# Patient Record
Sex: FEMALE | Race: WHITE | NOT HISPANIC OR LATINO
[De-identification: names, ages, dates, MRNs, and addresses within clinical notes are randomized per-mention and may not be internally consistent; named-entity substitution may affect disease eponyms.]

---

## 2017-04-27 ENCOUNTER — APPOINTMENT (OUTPATIENT)
Dept: UROLOGY | Facility: CLINIC | Age: 22
End: 2017-04-27

## 2017-04-27 VITALS
WEIGHT: 190 LBS | DIASTOLIC BLOOD PRESSURE: 90 MMHG | HEIGHT: 64 IN | TEMPERATURE: 97.6 F | BODY MASS INDEX: 32.44 KG/M2 | SYSTOLIC BLOOD PRESSURE: 125 MMHG | HEART RATE: 73 BPM

## 2017-04-27 DIAGNOSIS — R35.1 NOCTURIA: ICD-10-CM

## 2017-04-27 DIAGNOSIS — Z87.39 PERSONAL HISTORY OF OTHER DISEASES OF THE MUSCULOSKELETAL SYSTEM AND CONNECTIVE TISSUE: ICD-10-CM

## 2017-04-27 DIAGNOSIS — Z78.9 OTHER SPECIFIED HEALTH STATUS: ICD-10-CM

## 2017-04-27 DIAGNOSIS — R30.9 PAINFUL MICTURITION, UNSPECIFIED: ICD-10-CM

## 2017-04-27 DIAGNOSIS — S40.019A CONTUSION OF UNSPECIFIED SHOULDER, INITIAL ENCOUNTER: ICD-10-CM

## 2017-04-27 DIAGNOSIS — R35.0 FREQUENCY OF MICTURITION: ICD-10-CM

## 2017-04-27 DIAGNOSIS — R32 UNSPECIFIED URINARY INCONTINENCE: ICD-10-CM

## 2017-04-27 LAB
BILIRUB UR QL STRIP: NORMAL
CLARITY UR: CLEAR
COLLECTION METHOD: NORMAL
GLUCOSE UR-MCNC: NORMAL
HCG UR QL: 0.2 EU/DL
HGB UR QL STRIP.AUTO: NORMAL
KETONES UR-MCNC: NORMAL
LEUKOCYTE ESTERASE UR QL STRIP: NORMAL
NITRITE UR QL STRIP: NORMAL
PH UR STRIP: 6
PROT UR STRIP-MCNC: NORMAL
SP GR UR STRIP: 1.01

## 2017-06-08 ENCOUNTER — APPOINTMENT (OUTPATIENT)
Dept: UROLOGY | Facility: CLINIC | Age: 22
End: 2017-06-08

## 2017-06-08 VITALS
BODY MASS INDEX: 32.44 KG/M2 | WEIGHT: 190 LBS | HEART RATE: 94 BPM | SYSTOLIC BLOOD PRESSURE: 119 MMHG | DIASTOLIC BLOOD PRESSURE: 81 MMHG | HEIGHT: 64 IN

## 2017-06-08 DIAGNOSIS — R32 UNSPECIFIED URINARY INCONTINENCE: ICD-10-CM

## 2017-06-08 DIAGNOSIS — R39.15 URGENCY OF URINATION: ICD-10-CM

## 2017-06-08 DIAGNOSIS — R35.0 FREQUENCY OF MICTURITION: ICD-10-CM

## 2017-06-08 RX ORDER — MIRABEGRON 50 MG/1
50 TABLET, FILM COATED, EXTENDED RELEASE ORAL
Qty: 30 | Refills: 5 | Status: ACTIVE | COMMUNITY
Start: 2017-04-27 | End: 1900-01-01

## 2017-08-10 ENCOUNTER — APPOINTMENT (OUTPATIENT)
Dept: UROLOGY | Facility: CLINIC | Age: 22
End: 2017-08-10

## 2022-03-05 DIAGNOSIS — Z13.6 ENCOUNTER FOR SCREENING FOR CARDIOVASCULAR DISORDERS: ICD-10-CM

## 2022-03-09 ENCOUNTER — APPOINTMENT (OUTPATIENT)
Dept: PEDIATRIC MEDICAL GENETICS | Facility: CLINIC | Age: 27
End: 2022-03-09

## 2022-03-09 ENCOUNTER — APPOINTMENT (OUTPATIENT)
Dept: PEDIATRIC CARDIOLOGY | Facility: CLINIC | Age: 27
End: 2022-03-09

## 2022-03-31 ENCOUNTER — APPOINTMENT (OUTPATIENT)
Dept: PEDIATRIC MEDICAL GENETICS | Facility: CLINIC | Age: 27
End: 2022-03-31

## 2022-03-31 NOTE — HISTORY OF PRESENT ILLNESS
[FreeTextEntry1] : Ms. Gerardo is a 26 year old female with multiple medical problems.  She is being referred by her Rheumatologist to rule out a connective tissue disorder due to chronic joint pain.  She gets no relief from OTC medications.    X-rays of the hands and wrists are normal bilaterally.  An MRI in Sept 2021 showed cervical and lumbar degenerative disc disease and osteoarthritis.  A CCP antibody, JOHN, rheumatoid factor, liver function tests and CBC were normal.  An ESR was slightly elevated at 26, CRP was 8 and she was slightly hyperglycemic at 102.  \par \par   Ms Gerardo claims to get injured easily and it takes her a long time for her to heal.  She had shoulder surgery (x3) following a rotator cuff injury and bursitis.  She had a meniscus tear of her left knee without trauma.  Surgery is planned for the future.  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  She is still healing and uses a cane to ambulate.  She had a back injury she sustained at work which is treated with repeated nerve blocks.  She has had surgery on her adenoids and sinuses.  She had an umbilical hernia repair at the age of 17.  She has had surgery for carpal tunnel syndrome.  \par \par Ms. Gerardo has gastroparesis, acid reflux, sleep apnea and arthritis.  She suffers from migraines which started during childhood.  During a workup for her migraines, she was diagnosed with a Chiari malformation.  However, a brain MRI performed in July 2014 was normal.  Ms. Gerardo has anxiety and depression.  Her early developmental milestones were normal.  During her school years, she was in special education due to a learning disability.  She as in a regular class but got pulled out for extra help with reading, writing and math.  She graduated HS with a regular diploma and attended 2 years of college.  She is currently not working due to a medical disability.\par \par Ms. Gerardo has a history of multiple urological symptoms.  Her urinary symptoms began at age 14 at which time she complained of frequent urination, nocturia, week stream, incomplete bladder emptying and incontinence.  She was diagnosed with pelvic floor weakness.  She had a normal cystoscopy and normal renal/pelvic sonogram in Oct 2014.  The sonogram revealed a nodule with central calcification on the left lower lobe of the lung and 2 adjacent small nodules.  She is being followed for these findings every 3 months.  \par \par Ms. Gerardo does not have a pectus deformity, scoliosis or flat feet.  She wears glasses for myopia.  She does not have dental crowding.  She has never had a spontaneous pneumothorax, subluxation, dislocation or organ prolapse.   She has chronic joint pain.  She does not bruise easily or have prolonged bleeding.   Ms. Gerardo reports that she has prolonged healing and abnormal scarring.

## 2022-03-31 NOTE — FAMILY HISTORY
[FreeTextEntry1] : Ms. Gerardo has a full sister and brother and a half-brother from a common mother who are healthy.  Her father had a knee replacement and has arthritis and sleep apnea.  The family history is otherwise unremarkable.  Her mother is of Tuvaluan/Mohawk/English/ ancestry and her father is of Sicilian/Mohawk/English/Polish ancestry.  Her parents are nonconsanguineous.

## 2022-03-31 NOTE — REASON FOR VISIT
[FreeTextEntry3] : she is being referred by her Rheumatologist to R/O a connective tissue disorder.\par \par Patient was seen with genetic counselor Stephanie Jensen.\par DRAFT WRITTEN PRIOR TO PATIENTS APPT

## 2022-10-14 ENCOUNTER — EMERGENCY (EMERGENCY)
Facility: HOSPITAL | Age: 27
LOS: 0 days | Discharge: HOME | End: 2022-10-14
Attending: EMERGENCY MEDICINE
Payer: MEDICAID

## 2022-10-14 VITALS
SYSTOLIC BLOOD PRESSURE: 124 MMHG | DIASTOLIC BLOOD PRESSURE: 73 MMHG | HEART RATE: 96 BPM | OXYGEN SATURATION: 100 % | RESPIRATION RATE: 19 BRPM | TEMPERATURE: 98 F

## 2022-10-14 VITALS
HEART RATE: 82 BPM | TEMPERATURE: 98 F | SYSTOLIC BLOOD PRESSURE: 115 MMHG | DIASTOLIC BLOOD PRESSURE: 81 MMHG | RESPIRATION RATE: 19 BRPM | OXYGEN SATURATION: 97 %

## 2022-10-14 DIAGNOSIS — K21.9 GASTRO-ESOPHAGEAL REFLUX DISEASE WITHOUT ESOPHAGITIS: ICD-10-CM

## 2022-10-14 DIAGNOSIS — O99.611 DISEASES OF THE DIGESTIVE SYSTEM COMPLICATING PREGNANCY, FIRST TRIMESTER: ICD-10-CM

## 2022-10-14 DIAGNOSIS — Z88.0 ALLERGY STATUS TO PENICILLIN: ICD-10-CM

## 2022-10-14 DIAGNOSIS — Z88.1 ALLERGY STATUS TO OTHER ANTIBIOTIC AGENTS STATUS: ICD-10-CM

## 2022-10-14 DIAGNOSIS — R11.2 NAUSEA WITH VOMITING, UNSPECIFIED: ICD-10-CM

## 2022-10-14 DIAGNOSIS — Z88.8 ALLERGY STATUS TO OTHER DRUGS, MEDICAMENTS AND BIOLOGICAL SUBSTANCES STATUS: ICD-10-CM

## 2022-10-14 DIAGNOSIS — Z3A.08 8 WEEKS GESTATION OF PREGNANCY: ICD-10-CM

## 2022-10-14 DIAGNOSIS — Z88.5 ALLERGY STATUS TO NARCOTIC AGENT: ICD-10-CM

## 2022-10-14 DIAGNOSIS — O21.9 VOMITING OF PREGNANCY, UNSPECIFIED: ICD-10-CM

## 2022-10-14 DIAGNOSIS — Z91.041 RADIOGRAPHIC DYE ALLERGY STATUS: ICD-10-CM

## 2022-10-14 LAB
ALBUMIN SERPL ELPH-MCNC: 3.4 G/DL — SIGNIFICANT CHANGE UP (ref 3.3–5)
ALP SERPL-CCNC: 78 U/L — SIGNIFICANT CHANGE UP (ref 40–120)
ALT FLD-CCNC: 24 U/L — SIGNIFICANT CHANGE UP (ref 12–78)
ANION GAP SERPL CALC-SCNC: 6 MMOL/L — SIGNIFICANT CHANGE UP (ref 5–17)
APPEARANCE UR: ABNORMAL
AST SERPL-CCNC: 12 U/L — LOW (ref 15–37)
BASOPHILS # BLD AUTO: 0.02 K/UL — SIGNIFICANT CHANGE UP (ref 0–0.2)
BASOPHILS NFR BLD AUTO: 0.2 % — SIGNIFICANT CHANGE UP (ref 0–2)
BILIRUB SERPL-MCNC: 0.2 MG/DL — SIGNIFICANT CHANGE UP (ref 0.2–1.2)
BILIRUB UR-MCNC: NEGATIVE — SIGNIFICANT CHANGE UP
BUN SERPL-MCNC: 10 MG/DL — SIGNIFICANT CHANGE UP (ref 7–23)
CALCIUM SERPL-MCNC: 9.2 MG/DL — SIGNIFICANT CHANGE UP (ref 8.5–10.1)
CHLORIDE SERPL-SCNC: 104 MMOL/L — SIGNIFICANT CHANGE UP (ref 96–108)
CO2 SERPL-SCNC: 28 MMOL/L — SIGNIFICANT CHANGE UP (ref 22–31)
COLOR SPEC: YELLOW — SIGNIFICANT CHANGE UP
CREAT SERPL-MCNC: 0.63 MG/DL — SIGNIFICANT CHANGE UP (ref 0.5–1.3)
DIFF PNL FLD: NEGATIVE — SIGNIFICANT CHANGE UP
EGFR: 125 ML/MIN/1.73M2 — SIGNIFICANT CHANGE UP
EOSINOPHIL # BLD AUTO: 0.02 K/UL — SIGNIFICANT CHANGE UP (ref 0–0.5)
EOSINOPHIL NFR BLD AUTO: 0.2 % — SIGNIFICANT CHANGE UP (ref 0–6)
GLUCOSE SERPL-MCNC: 90 MG/DL — SIGNIFICANT CHANGE UP (ref 70–99)
GLUCOSE UR QL: NEGATIVE — SIGNIFICANT CHANGE UP
HCT VFR BLD CALC: 34.3 % — LOW (ref 34.5–45)
HGB BLD-MCNC: 11.6 G/DL — SIGNIFICANT CHANGE UP (ref 11.5–15.5)
IMM GRANULOCYTES NFR BLD AUTO: 0.4 % — SIGNIFICANT CHANGE UP (ref 0–0.9)
KETONES UR-MCNC: NEGATIVE — SIGNIFICANT CHANGE UP
LEUKOCYTE ESTERASE UR-ACNC: ABNORMAL
LIDOCAIN IGE QN: 67 U/L — LOW (ref 73–393)
LYMPHOCYTES # BLD AUTO: 1.97 K/UL — SIGNIFICANT CHANGE UP (ref 1–3.3)
LYMPHOCYTES # BLD AUTO: 17.6 % — SIGNIFICANT CHANGE UP (ref 13–44)
MCHC RBC-ENTMCNC: 30.4 PG — SIGNIFICANT CHANGE UP (ref 27–34)
MCHC RBC-ENTMCNC: 33.8 GM/DL — SIGNIFICANT CHANGE UP (ref 32–36)
MCV RBC AUTO: 89.8 FL — SIGNIFICANT CHANGE UP (ref 80–100)
MONOCYTES # BLD AUTO: 0.51 K/UL — SIGNIFICANT CHANGE UP (ref 0–0.9)
MONOCYTES NFR BLD AUTO: 4.5 % — SIGNIFICANT CHANGE UP (ref 2–14)
NEUTROPHILS # BLD AUTO: 8.65 K/UL — HIGH (ref 1.8–7.4)
NEUTROPHILS NFR BLD AUTO: 77.1 % — HIGH (ref 43–77)
NITRITE UR-MCNC: NEGATIVE — SIGNIFICANT CHANGE UP
PH UR: 7 — SIGNIFICANT CHANGE UP (ref 5–8)
PLATELET # BLD AUTO: 295 K/UL — SIGNIFICANT CHANGE UP (ref 150–400)
POTASSIUM SERPL-MCNC: 3.7 MMOL/L — SIGNIFICANT CHANGE UP (ref 3.5–5.3)
POTASSIUM SERPL-SCNC: 3.7 MMOL/L — SIGNIFICANT CHANGE UP (ref 3.5–5.3)
PROT SERPL-MCNC: 7.8 GM/DL — SIGNIFICANT CHANGE UP (ref 6–8.3)
PROT UR-MCNC: NEGATIVE — SIGNIFICANT CHANGE UP
RBC # BLD: 3.82 M/UL — SIGNIFICANT CHANGE UP (ref 3.8–5.2)
RBC # FLD: 12.3 % — SIGNIFICANT CHANGE UP (ref 10.3–14.5)
SODIUM SERPL-SCNC: 138 MMOL/L — SIGNIFICANT CHANGE UP (ref 135–145)
SP GR SPEC: 1.01 — SIGNIFICANT CHANGE UP (ref 1.01–1.02)
UROBILINOGEN FLD QL: NEGATIVE — SIGNIFICANT CHANGE UP
WBC # BLD: 11.22 K/UL — HIGH (ref 3.8–10.5)
WBC # FLD AUTO: 11.22 K/UL — HIGH (ref 3.8–10.5)

## 2022-10-14 PROCEDURE — 99284 EMERGENCY DEPT VISIT MOD MDM: CPT

## 2022-10-14 PROCEDURE — 96374 THER/PROPH/DIAG INJ IV PUSH: CPT

## 2022-10-14 PROCEDURE — 99284 EMERGENCY DEPT VISIT MOD MDM: CPT | Mod: 25

## 2022-10-14 PROCEDURE — 83690 ASSAY OF LIPASE: CPT

## 2022-10-14 PROCEDURE — 87086 URINE CULTURE/COLONY COUNT: CPT

## 2022-10-14 PROCEDURE — 85025 COMPLETE CBC W/AUTO DIFF WBC: CPT

## 2022-10-14 PROCEDURE — 80053 COMPREHEN METABOLIC PANEL: CPT

## 2022-10-14 PROCEDURE — 81001 URINALYSIS AUTO W/SCOPE: CPT

## 2022-10-14 PROCEDURE — 36415 COLL VENOUS BLD VENIPUNCTURE: CPT

## 2022-10-14 RX ORDER — SODIUM CHLORIDE 9 MG/ML
1000 INJECTION, SOLUTION INTRAVENOUS
Refills: 0 | Status: DISCONTINUED | OUTPATIENT
Start: 2022-10-14 | End: 2022-10-14

## 2022-10-14 RX ORDER — SODIUM CHLORIDE 9 MG/ML
1000 INJECTION INTRAMUSCULAR; INTRAVENOUS; SUBCUTANEOUS ONCE
Refills: 0 | Status: COMPLETED | OUTPATIENT
Start: 2022-10-14 | End: 2022-10-14

## 2022-10-14 RX ORDER — METOCLOPRAMIDE HCL 10 MG
10 TABLET ORAL ONCE
Refills: 0 | Status: COMPLETED | OUTPATIENT
Start: 2022-10-14 | End: 2022-10-14

## 2022-10-14 RX ADMIN — Medication 10 MILLIGRAM(S): at 17:57

## 2022-10-14 RX ADMIN — SODIUM CHLORIDE 250 MILLILITER(S): 9 INJECTION, SOLUTION INTRAVENOUS at 17:56

## 2022-10-14 NOTE — ED STATDOCS - NS ED ROS FT
Constitutional: nad, well appearing  HEENT:  no nasal congestion, eye drainage or ear pain.    CVS:  no cp  Resp:  No sob, no cough  GI:  no abdominal pain, +n/v  :  no dysuria  MSK: no joint pain or limited ROM  Skin: no rash  Neuro: no change in mental status or level of consciousness  Heme/lymph: no bleeding

## 2022-10-14 NOTE — ED STATDOCS - OBJECTIVE STATEMENT
26 y/o female 8 weeks pregnant with a PMHx of GERD presents to the ED c/o n/v. Pt states she has been nauseous throughout her pregnancy. Pt states she was on Pantoprazole for GERD, was told it was not safe in pregnancy and switched to Omeprazole. Denies vaginal bleeding, discharge, burning with urination. No other complaints at this time. OBGYN: Dr. Ramírez.

## 2022-10-14 NOTE — ED STATDOCS - CARE PLAN
Principal Discharge DX:	Nausea/vomiting in pregnancy  Secondary Diagnosis:	GERD (gastroesophageal reflux disease)   1

## 2022-10-14 NOTE — ED ADULT NURSE NOTE - OBJECTIVE STATEMENT
Pt comes to the ED for evaluation of acid reflux. Pt states that she is 8 weeks pregnant and that she suffers from acid reflux prior to becoming pregnant and that she has not been able to take her prescribed medication with being pregnant and other medications are not working. Pt c/o nausea without vomiting.

## 2022-10-14 NOTE — ED STATDOCS - PROGRESS NOTE DETAILS
28 y/o Female, 8 weeks pregnant, presents to ED c/o nausea and vomiting over last 2 weeks.  Pt with h/o GERD that usually responds to Pantoprazole, but OB said she cannot take it in pregnancy.  Started PO Omeprazole today at 1pm.  Pt reports she is approx 8-9 weeks pregnant.  Food seen in vomit.  Neg blood.  Denies dysuria, hematuria.  Denies fevers.  Urine cloudy with occasional bacteria.  As pt does not have symptoms of UTI and has multiple allergies to meds, will wait on Urine Cx results prior to starting treatment.  Discussed plan with pt.  Pt will continue dyclegis, omeprazole at home.  f/u with PMD.  Yodit Caputo PA-C Pt ate some crackers and had sips of liquid without vomiting.  Will dc home to f/u with PMD.  Yodit Caputo PA-C

## 2022-10-14 NOTE — ED ADULT TRIAGE NOTE - CHIEF COMPLAINT QUOTE
pt presents to ed ambulatory for evaluation of nausea and vomiting- pt is 8-9 weeks pregnant and has been  nauseated through out the pregnancy. reports she has gerd too and on Omeprazole with no relief. this is 1st pregnancy, OB dr allen. denies vaginal bleeding/leaking/cramping

## 2022-10-14 NOTE — ED STATDOCS - PATIENT PORTAL LINK FT
You can access the FollowMyHealth Patient Portal offered by [facility] by registering at the following website: [email or web address]. By joining [employer]’s FollowMyHealth portal, you will also be able to view your health information using other applications (apps) compatible with our system.

## 2022-10-14 NOTE — ED STATDOCS - NSFOLLOWUPINSTRUCTIONS_ED_ALL_ED_FT
Morning Sickness       Morning sickness is when you feel like you may vomit (feel nauseous) during pregnancy. Sometimes, you may vomit. Morning sickness most often happens in the morning, but it can also happen at any time of the day. Some women may have morning sickness that makes them vomit all the time. This is a more serious problem that needs treatment.      What are the causes?    The cause of this condition is not known.      What increases the risk?    •You had vomiting or a feeling like you may vomit before your pregnancy.      •You had morning sickness in another pregnancy.       •You are pregnant with more than one baby, such as twins.        What are the signs or symptoms?    •Feeling like you may vomit.      •Vomiting.        How is this treated?    Treatment is usually not needed for this condition. You may only need to change what you eat. In some cases, your doctor may give you some things to take for your condition. These include:  •Vitamin B6 supplements.      •Medicines to treat the feeling that you may vomit.      •Ginger.        Follow these instructions at home:    Medicines     •Take over-the-counter and prescription medicines only as told by your doctor. Do not take any medicines until you talk with your doctor about them first.      •Take multivitamins before you get pregnant. These can stop or lessen the symptoms of morning sickness.      Eating and drinking     •Eat dry toast or crackers before getting out of bed.      •Eat 5 or 6 small meals a day.      •Eat dry and bland foods like rice and baked potatoes.      • Do not eat greasy, fatty, or spicy foods.      •Have someone cook for you if the smell of food causes you to vomit or to feel like you may vomit.      •If you feel like you may vomit after taking prenatal vitamins, take them at night or with a snack.      •Eat protein foods when you need a snack. Nuts, yogurt, and cheese are good choices.      •Drink fluids throughout the day.      •Try ginger ale made with real ginger, ginger tea made from fresh grated ginger, or ginger candies.      General instructions     • Do not smoke or use any products that contain nicotine or tobacco. If you need help quitting, ask your doctor.      •Use an air purifier to keep the air in your house free of smells.      •Get lots of fresh air.      •Try to avoid smells that make you feel sick.      •Try wearing an acupressure wristband. This is a wristband that is used to treat seasickness.      •Try a treatment called acupuncture. In this treatment, a doctor puts needles into certain areas of your body to make you feel better.        Contact a doctor if:    •You need medicine to feel better.      •You feel dizzy or light-headed.      •You are losing weight.        Get help right away if:    •The feeling that you may vomit will not go away, or you cannot stop vomiting.      •You faint.      •You have very bad pain in your belly.        Summary    •Morning sickness is when you feel like you may vomit (feel nauseous) during pregnancy.      •You may feel sick in the morning, but you can feel this way at any time of the day.      •Making some changes to what you eat may help your symptoms go away.      This information is not intended to replace advice given to you by your health care provider. Make sure you discuss any questions you have with your health care provider.      Document Revised: 08/02/2021 Document Reviewed: 07/12/2021    Elsevier Patient Education © 2022 Elsevier Inc.

## 2022-10-14 NOTE — ED STATDOCS - NS ED ATTENDING STATEMENT MOD
This was a shared visit with the ABDIAS. I reviewed and verified the documentation and independently performed the documented:

## 2022-10-16 LAB
CULTURE RESULTS: NO GROWTH — SIGNIFICANT CHANGE UP
SPECIMEN SOURCE: SIGNIFICANT CHANGE UP

## 2022-12-03 ENCOUNTER — EMERGENCY (EMERGENCY)
Facility: HOSPITAL | Age: 27
LOS: 0 days | Discharge: HOME | End: 2022-12-03
Attending: EMERGENCY MEDICINE
Payer: MEDICAID

## 2022-12-03 VITALS
HEART RATE: 88 BPM | TEMPERATURE: 99 F | OXYGEN SATURATION: 99 % | DIASTOLIC BLOOD PRESSURE: 76 MMHG | SYSTOLIC BLOOD PRESSURE: 119 MMHG | RESPIRATION RATE: 18 BRPM

## 2022-12-03 VITALS
TEMPERATURE: 100 F | RESPIRATION RATE: 16 BRPM | HEART RATE: 94 BPM | OXYGEN SATURATION: 100 % | SYSTOLIC BLOOD PRESSURE: 119 MMHG | DIASTOLIC BLOOD PRESSURE: 76 MMHG

## 2022-12-03 DIAGNOSIS — Z88.8 ALLERGY STATUS TO OTHER DRUGS, MEDICAMENTS AND BIOLOGICAL SUBSTANCES STATUS: ICD-10-CM

## 2022-12-03 DIAGNOSIS — Z88.6 ALLERGY STATUS TO ANALGESIC AGENT: ICD-10-CM

## 2022-12-03 DIAGNOSIS — Z3A.15 15 WEEKS GESTATION OF PREGNANCY: ICD-10-CM

## 2022-12-03 DIAGNOSIS — O26.891 OTHER SPECIFIED PREGNANCY RELATED CONDITIONS, FIRST TRIMESTER: ICD-10-CM

## 2022-12-03 DIAGNOSIS — K31.84 GASTROPARESIS: ICD-10-CM

## 2022-12-03 DIAGNOSIS — O23.42 UNSPECIFIED INFECTION OF URINARY TRACT IN PREGNANCY, SECOND TRIMESTER: ICD-10-CM

## 2022-12-03 DIAGNOSIS — Z88.5 ALLERGY STATUS TO NARCOTIC AGENT: ICD-10-CM

## 2022-12-03 DIAGNOSIS — M54.9 DORSALGIA, UNSPECIFIED: ICD-10-CM

## 2022-12-03 DIAGNOSIS — Z98.890 OTHER SPECIFIED POSTPROCEDURAL STATES: ICD-10-CM

## 2022-12-03 DIAGNOSIS — O99.891 OTHER SPECIFIED DISEASES AND CONDITIONS COMPLICATING PREGNANCY: ICD-10-CM

## 2022-12-03 DIAGNOSIS — G89.29 OTHER CHRONIC PAIN: ICD-10-CM

## 2022-12-03 DIAGNOSIS — K21.9 GASTRO-ESOPHAGEAL REFLUX DISEASE WITHOUT ESOPHAGITIS: ICD-10-CM

## 2022-12-03 DIAGNOSIS — R10.31 RIGHT LOWER QUADRANT PAIN: ICD-10-CM

## 2022-12-03 DIAGNOSIS — Z88.0 ALLERGY STATUS TO PENICILLIN: ICD-10-CM

## 2022-12-03 DIAGNOSIS — Z88.1 ALLERGY STATUS TO OTHER ANTIBIOTIC AGENTS STATUS: ICD-10-CM

## 2022-12-03 DIAGNOSIS — O99.612 DISEASES OF THE DIGESTIVE SYSTEM COMPLICATING PREGNANCY, SECOND TRIMESTER: ICD-10-CM

## 2022-12-03 DIAGNOSIS — Z20.822 CONTACT WITH AND (SUSPECTED) EXPOSURE TO COVID-19: ICD-10-CM

## 2022-12-03 DIAGNOSIS — Z91.041 RADIOGRAPHIC DYE ALLERGY STATUS: ICD-10-CM

## 2022-12-03 LAB
ALBUMIN SERPL ELPH-MCNC: 3 G/DL — LOW (ref 3.3–5)
ALP SERPL-CCNC: 79 U/L — SIGNIFICANT CHANGE UP (ref 40–120)
ALT FLD-CCNC: 20 U/L — SIGNIFICANT CHANGE UP (ref 12–78)
ANION GAP SERPL CALC-SCNC: 7 MMOL/L — SIGNIFICANT CHANGE UP (ref 5–17)
APPEARANCE UR: ABNORMAL
AST SERPL-CCNC: 27 U/L — SIGNIFICANT CHANGE UP (ref 15–37)
BASOPHILS # BLD AUTO: 0.02 K/UL — SIGNIFICANT CHANGE UP (ref 0–0.2)
BASOPHILS NFR BLD AUTO: 0.2 % — SIGNIFICANT CHANGE UP (ref 0–2)
BILIRUB SERPL-MCNC: 0.3 MG/DL — SIGNIFICANT CHANGE UP (ref 0.2–1.2)
BILIRUB UR-MCNC: NEGATIVE — SIGNIFICANT CHANGE UP
BUN SERPL-MCNC: 8 MG/DL — SIGNIFICANT CHANGE UP (ref 7–23)
CALCIUM SERPL-MCNC: 9.1 MG/DL — SIGNIFICANT CHANGE UP (ref 8.5–10.1)
CHLORIDE SERPL-SCNC: 104 MMOL/L — SIGNIFICANT CHANGE UP (ref 96–108)
CO2 SERPL-SCNC: 25 MMOL/L — SIGNIFICANT CHANGE UP (ref 22–31)
COLOR SPEC: YELLOW — SIGNIFICANT CHANGE UP
CREAT SERPL-MCNC: 0.57 MG/DL — SIGNIFICANT CHANGE UP (ref 0.5–1.3)
DIFF PNL FLD: NEGATIVE — SIGNIFICANT CHANGE UP
EGFR: 128 ML/MIN/1.73M2 — SIGNIFICANT CHANGE UP
EOSINOPHIL # BLD AUTO: 0.03 K/UL — SIGNIFICANT CHANGE UP (ref 0–0.5)
EOSINOPHIL NFR BLD AUTO: 0.3 % — SIGNIFICANT CHANGE UP (ref 0–6)
FLUAV AG NPH QL: SIGNIFICANT CHANGE UP
FLUBV AG NPH QL: SIGNIFICANT CHANGE UP
GLUCOSE SERPL-MCNC: 88 MG/DL — SIGNIFICANT CHANGE UP (ref 70–99)
GLUCOSE UR QL: NEGATIVE — SIGNIFICANT CHANGE UP
HCG SERPL-ACNC: HIGH MIU/ML
HCT VFR BLD CALC: 33.8 % — LOW (ref 34.5–45)
HGB BLD-MCNC: 11.2 G/DL — LOW (ref 11.5–15.5)
IMM GRANULOCYTES NFR BLD AUTO: 0.3 % — SIGNIFICANT CHANGE UP (ref 0–0.9)
KETONES UR-MCNC: ABNORMAL
LEUKOCYTE ESTERASE UR-ACNC: ABNORMAL
LYMPHOCYTES # BLD AUTO: 18.3 % — SIGNIFICANT CHANGE UP (ref 13–44)
LYMPHOCYTES # BLD AUTO: 2.01 K/UL — SIGNIFICANT CHANGE UP (ref 1–3.3)
MCHC RBC-ENTMCNC: 29.3 PG — SIGNIFICANT CHANGE UP (ref 27–34)
MCHC RBC-ENTMCNC: 33.1 GM/DL — SIGNIFICANT CHANGE UP (ref 32–36)
MCV RBC AUTO: 88.5 FL — SIGNIFICANT CHANGE UP (ref 80–100)
MONOCYTES # BLD AUTO: 0.53 K/UL — SIGNIFICANT CHANGE UP (ref 0–0.9)
MONOCYTES NFR BLD AUTO: 4.8 % — SIGNIFICANT CHANGE UP (ref 2–14)
NEUTROPHILS # BLD AUTO: 8.35 K/UL — HIGH (ref 1.8–7.4)
NEUTROPHILS NFR BLD AUTO: 76.1 % — SIGNIFICANT CHANGE UP (ref 43–77)
NITRITE UR-MCNC: NEGATIVE — SIGNIFICANT CHANGE UP
PH UR: 6 — SIGNIFICANT CHANGE UP (ref 5–8)
PLATELET # BLD AUTO: 296 K/UL — SIGNIFICANT CHANGE UP (ref 150–400)
POTASSIUM SERPL-MCNC: 4 MMOL/L — SIGNIFICANT CHANGE UP (ref 3.5–5.3)
POTASSIUM SERPL-SCNC: 4 MMOL/L — SIGNIFICANT CHANGE UP (ref 3.5–5.3)
PROT SERPL-MCNC: 7.5 GM/DL — SIGNIFICANT CHANGE UP (ref 6–8.3)
PROT UR-MCNC: 30 MG/DL
RBC # BLD: 3.82 M/UL — SIGNIFICANT CHANGE UP (ref 3.8–5.2)
RBC # FLD: 12.6 % — SIGNIFICANT CHANGE UP (ref 10.3–14.5)
RSV RNA NPH QL NAA+NON-PROBE: SIGNIFICANT CHANGE UP
SARS-COV-2 RNA SPEC QL NAA+PROBE: SIGNIFICANT CHANGE UP
SODIUM SERPL-SCNC: 136 MMOL/L — SIGNIFICANT CHANGE UP (ref 135–145)
SP GR SPEC: 1.02 — SIGNIFICANT CHANGE UP (ref 1.01–1.02)
UROBILINOGEN FLD QL: 1
WBC # BLD: 10.97 K/UL — HIGH (ref 3.8–10.5)
WBC # FLD AUTO: 10.97 K/UL — HIGH (ref 3.8–10.5)

## 2022-12-03 PROCEDURE — 76805 OB US >/= 14 WKS SNGL FETUS: CPT

## 2022-12-03 PROCEDURE — 36415 COLL VENOUS BLD VENIPUNCTURE: CPT

## 2022-12-03 PROCEDURE — 99285 EMERGENCY DEPT VISIT HI MDM: CPT

## 2022-12-03 PROCEDURE — 87086 URINE CULTURE/COLONY COUNT: CPT

## 2022-12-03 PROCEDURE — 99284 EMERGENCY DEPT VISIT MOD MDM: CPT | Mod: 25

## 2022-12-03 PROCEDURE — 96374 THER/PROPH/DIAG INJ IV PUSH: CPT

## 2022-12-03 PROCEDURE — 80053 COMPREHEN METABOLIC PANEL: CPT

## 2022-12-03 PROCEDURE — 76805 OB US >/= 14 WKS SNGL FETUS: CPT | Mod: 26

## 2022-12-03 PROCEDURE — 81001 URINALYSIS AUTO W/SCOPE: CPT

## 2022-12-03 PROCEDURE — 85025 COMPLETE CBC W/AUTO DIFF WBC: CPT

## 2022-12-03 PROCEDURE — 84702 CHORIONIC GONADOTROPIN TEST: CPT

## 2022-12-03 PROCEDURE — 0241U: CPT

## 2022-12-03 RX ORDER — ACETAMINOPHEN 500 MG
1000 TABLET ORAL ONCE
Refills: 0 | Status: COMPLETED | OUTPATIENT
Start: 2022-12-03 | End: 2022-12-03

## 2022-12-03 RX ORDER — NITROFURANTOIN MACROCRYSTAL 50 MG
100 CAPSULE ORAL ONCE
Refills: 0 | Status: COMPLETED | OUTPATIENT
Start: 2022-12-03 | End: 2022-12-03

## 2022-12-03 RX ORDER — SODIUM CHLORIDE 9 MG/ML
1000 INJECTION INTRAMUSCULAR; INTRAVENOUS; SUBCUTANEOUS ONCE
Refills: 0 | Status: COMPLETED | OUTPATIENT
Start: 2022-12-03 | End: 2022-12-03

## 2022-12-03 RX ORDER — NITROFURANTOIN MACROCRYSTAL 50 MG
1 CAPSULE ORAL
Qty: 14 | Refills: 0
Start: 2022-12-03 | End: 2022-12-09

## 2022-12-03 RX ADMIN — Medication 400 MILLIGRAM(S): at 14:33

## 2022-12-03 RX ADMIN — Medication 100 MILLIGRAM(S): at 15:07

## 2022-12-03 RX ADMIN — SODIUM CHLORIDE 2000 MILLILITER(S): 9 INJECTION INTRAMUSCULAR; INTRAVENOUS; SUBCUTANEOUS at 14:34

## 2022-12-03 NOTE — ED STATDOCS - OBJECTIVE STATEMENT
26 yo female w/PMHx of gastroparesis, hernia repair, GERD presents to the ED c/o right sided lower abd pain that started this morning. Pt reports that the pain is intermittent. Pt was wrapping presents this morning when the pain began. Pt is 15 weeks pregnant. Denies any vaginal bleeding or cramping. Pt reports having back pain but states that it is chronic. Pt did not take anything for the pain. No other complaints at this time.

## 2022-12-03 NOTE — ED STATDOCS - PROGRESS NOTE DETAILS
pt aware of labs and imaging results and treated for uti, pt aware to fu with orthopedic team and agrees with plan. pt well appearing on dc. -Sol Kyle PA-C

## 2022-12-03 NOTE — ED ADULT TRIAGE NOTE - CHIEF COMPLAINT QUOTE
pt presents to ED due to complaints of abdominal pain and back pain pt is currently 15 weeks pregnant states she has no vaginal bleeding

## 2022-12-03 NOTE — ED ADULT NURSE NOTE - NSIMPLEMENTINTERV_GEN_ALL_ED
Implemented All Universal Safety Interventions:  [place] to call system. Call bell, personal items and telephone within reach. Instruct patient to call for assistance. Room bathroom lighting operational. Non-slip footwear when patient is off stretcher. Physically safe environment: no spills, clutter or unnecessary equipment. Stretcher in lowest position, wheels locked, appropriate side rails in place.

## 2022-12-03 NOTE — ED ADULT NURSE NOTE - CAS EDN DISCHARGE ASSESSMENT
Dr. Farrar: I performed the initial face to face bedside interview with this patient regarding history of present illness, review of symptoms and past medical, social and family history.  I completed an independent physical examination.  I was the initial provider who evaluated this patient.  The history, review of symptoms and examination was documented by the scribe in my presence and I attest to the accuracy of the documentation.  I have signed out the follow up of any pending tests (i.e. labs, radiological studies) to the PA.  I have discussed the patient’s plan of care and disposition with the PA.
Alert and oriented to person, place and time

## 2022-12-03 NOTE — ED STATDOCS - ATTENDING APP SHARED VISIT CONTRIBUTION OF CARE
I, Carmela Quevedo MD, personally saw the patient with ACP.  I have personally performed a face to face diagnostic evaluation on this patient.  I have reviewed the ACP note and agree with the history, exam, and plan of care, except as noted.  I personally saw the patient and performed a substantive portion of the visit including all aspects of the medical decision making.

## 2022-12-03 NOTE — ED STATDOCS - PATIENT PORTAL LINK FT
You can access the FollowMyHealth Patient Portal offered by [facility] by registering at the following website: [email or web address]. By joining [employer] [facility]’s FollowMyHealth portal, you will also be able to view your health information using other applications (apps) compatible with our system.

## 2022-12-04 LAB
CULTURE RESULTS: SIGNIFICANT CHANGE UP
SPECIMEN SOURCE: SIGNIFICANT CHANGE UP

## 2023-02-28 ENCOUNTER — OUTPATIENT (OUTPATIENT)
Dept: INPATIENT UNIT | Facility: HOSPITAL | Age: 28
LOS: 1 days | End: 2023-02-28
Payer: MEDICAID

## 2023-02-28 VITALS
TEMPERATURE: 98 F | SYSTOLIC BLOOD PRESSURE: 126 MMHG | RESPIRATION RATE: 22 BRPM | DIASTOLIC BLOOD PRESSURE: 77 MMHG | HEART RATE: 109 BPM

## 2023-02-28 VITALS — SYSTOLIC BLOOD PRESSURE: 111 MMHG | HEART RATE: 92 BPM | DIASTOLIC BLOOD PRESSURE: 70 MMHG

## 2023-02-28 DIAGNOSIS — O47.03 FALSE LABOR BEFORE 37 COMPLETED WEEKS OF GESTATION, THIRD TRIMESTER: ICD-10-CM

## 2023-02-28 LAB
ALBUMIN SERPL ELPH-MCNC: 3.5 G/DL — SIGNIFICANT CHANGE UP (ref 3.3–5.2)
ALP SERPL-CCNC: 131 U/L — HIGH (ref 40–120)
ALT FLD-CCNC: 18 U/L — SIGNIFICANT CHANGE UP
ANION GAP SERPL CALC-SCNC: 14 MMOL/L — SIGNIFICANT CHANGE UP (ref 5–17)
APPEARANCE UR: ABNORMAL
AST SERPL-CCNC: 19 U/L — SIGNIFICANT CHANGE UP
BACTERIA # UR AUTO: ABNORMAL
BILIRUB SERPL-MCNC: <0.2 MG/DL — LOW (ref 0.4–2)
BILIRUB UR-MCNC: NEGATIVE — SIGNIFICANT CHANGE UP
BUN SERPL-MCNC: 6 MG/DL — LOW (ref 8–20)
CALCIUM SERPL-MCNC: 9.3 MG/DL — SIGNIFICANT CHANGE UP (ref 8.4–10.5)
CHLORIDE SERPL-SCNC: 101 MMOL/L — SIGNIFICANT CHANGE UP (ref 96–108)
CO2 SERPL-SCNC: 23 MMOL/L — SIGNIFICANT CHANGE UP (ref 22–29)
COLOR SPEC: YELLOW — SIGNIFICANT CHANGE UP
CREAT SERPL-MCNC: 0.52 MG/DL — SIGNIFICANT CHANGE UP (ref 0.5–1.3)
DIFF PNL FLD: NEGATIVE — SIGNIFICANT CHANGE UP
EGFR: 131 ML/MIN/1.73M2 — SIGNIFICANT CHANGE UP
EPI CELLS # UR: SIGNIFICANT CHANGE UP
GLUCOSE SERPL-MCNC: 105 MG/DL — HIGH (ref 70–99)
GLUCOSE UR QL: NEGATIVE MG/DL — SIGNIFICANT CHANGE UP
HCT VFR BLD CALC: 32.6 % — LOW (ref 34.5–45)
HGB BLD-MCNC: 10.6 G/DL — LOW (ref 11.5–15.5)
KETONES UR-MCNC: NEGATIVE — SIGNIFICANT CHANGE UP
LEUKOCYTE ESTERASE UR-ACNC: ABNORMAL
MCHC RBC-ENTMCNC: 28.5 PG — SIGNIFICANT CHANGE UP (ref 27–34)
MCHC RBC-ENTMCNC: 32.5 GM/DL — SIGNIFICANT CHANGE UP (ref 32–36)
MCV RBC AUTO: 87.6 FL — SIGNIFICANT CHANGE UP (ref 80–100)
NITRITE UR-MCNC: NEGATIVE — SIGNIFICANT CHANGE UP
PH UR: 7 — SIGNIFICANT CHANGE UP (ref 5–8)
PLATELET # BLD AUTO: 329 K/UL — SIGNIFICANT CHANGE UP (ref 150–400)
POTASSIUM SERPL-MCNC: 4 MMOL/L — SIGNIFICANT CHANGE UP (ref 3.5–5.3)
POTASSIUM SERPL-SCNC: 4 MMOL/L — SIGNIFICANT CHANGE UP (ref 3.5–5.3)
PROT SERPL-MCNC: 6.9 G/DL — SIGNIFICANT CHANGE UP (ref 6.6–8.7)
PROT UR-MCNC: 15
RBC # BLD: 3.72 M/UL — LOW (ref 3.8–5.2)
RBC # FLD: 14 % — SIGNIFICANT CHANGE UP (ref 10.3–14.5)
RBC CASTS # UR COMP ASSIST: SIGNIFICANT CHANGE UP /HPF (ref 0–4)
SODIUM SERPL-SCNC: 138 MMOL/L — SIGNIFICANT CHANGE UP (ref 135–145)
SP GR SPEC: 1.01 — SIGNIFICANT CHANGE UP (ref 1.01–1.02)
UROBILINOGEN FLD QL: 1 MG/DL
WBC # BLD: 12.12 K/UL — HIGH (ref 3.8–10.5)
WBC # FLD AUTO: 12.12 K/UL — HIGH (ref 3.8–10.5)
WBC UR QL: SIGNIFICANT CHANGE UP /HPF (ref 0–5)

## 2023-02-28 PROCEDURE — 59025 FETAL NON-STRESS TEST: CPT

## 2023-02-28 PROCEDURE — 81001 URINALYSIS AUTO W/SCOPE: CPT

## 2023-02-28 PROCEDURE — G0463: CPT

## 2023-02-28 PROCEDURE — 80053 COMPREHEN METABOLIC PANEL: CPT

## 2023-02-28 PROCEDURE — 85027 COMPLETE CBC AUTOMATED: CPT

## 2023-02-28 PROCEDURE — 36415 COLL VENOUS BLD VENIPUNCTURE: CPT

## 2023-02-28 RX ORDER — ONDANSETRON 8 MG/1
4 TABLET, FILM COATED ORAL ONCE
Refills: 0 | Status: COMPLETED | OUTPATIENT
Start: 2023-02-28 | End: 2023-02-28

## 2023-02-28 RX ORDER — ONDANSETRON 8 MG/1
1 TABLET, FILM COATED ORAL
Qty: 12 | Refills: 0
Start: 2023-02-28 | End: 2023-03-02

## 2023-02-28 RX ORDER — PANTOPRAZOLE SODIUM 20 MG/1
1 TABLET, DELAYED RELEASE ORAL
Qty: 30 | Refills: 0
Start: 2023-02-28 | End: 2023-03-29

## 2023-02-28 RX ORDER — SODIUM CHLORIDE 9 MG/ML
1000 INJECTION, SOLUTION INTRAVENOUS ONCE
Refills: 0 | Status: COMPLETED | OUTPATIENT
Start: 2023-02-28 | End: 2023-02-28

## 2023-02-28 RX ORDER — FAMOTIDINE 10 MG/ML
20 INJECTION INTRAVENOUS ONCE
Refills: 0 | Status: COMPLETED | OUTPATIENT
Start: 2023-02-28 | End: 2023-02-28

## 2023-02-28 RX ADMIN — ONDANSETRON 4 MILLIGRAM(S): 8 TABLET, FILM COATED ORAL at 13:13

## 2023-02-28 RX ADMIN — SODIUM CHLORIDE 1000 MILLILITER(S): 9 INJECTION, SOLUTION INTRAVENOUS at 12:40

## 2023-02-28 RX ADMIN — FAMOTIDINE 20 MILLIGRAM(S): 10 INJECTION INTRAVENOUS at 13:22

## 2023-02-28 NOTE — OB RN TRIAGE NOTE - NSOBDISCHARGEVS_OBGYN_ALL_OB
Confirmed that patient received an additional set of vital signs prior to discharge.
Referral for High Risk Care

## 2023-02-28 NOTE — OB RN TRIAGE NOTE - FALL HARM RISK - HARM RISK INTERVENTIONS

## 2023-02-28 NOTE — OB PROVIDER TRIAGE NOTE - HISTORY OF PRESENT ILLNESS
BRIDGET ERAZO is a 26yo  at 28 weeks (dated by LMP c/w x 1st trimester sono) with PMHx of GERD, gastroparesis, anxiety, iron deficiency anemia presenting for nausea and vomiting for 4 days. Patient reports feeling about 1 episode of vomiting every day for the past 4 days at night right before going to sleep. She has also recently had more problems with her acid reflux, has been taking her PRN pepcid every day as well as her normally scheduled omeprazole. Denies fever, chills, diarrhea, abdominal pain.     ROS:  Denies leakage of fluids, vaginal bleeding, painful contractions. Reports active fetal movement.   Denies RUQ pain, vision changes, increased leg swelling.   +Migraine headaches. No dizziness, lightheadedness.  +Knee, ankle, back pain at baseline.     PNC @    OBhx: denies  Gyn: denies hx of STIs, fibroids, or abnormal paps.  PMH: + GERD, gastroparesis, anxiety/depression, chronic pain  PSH: + umbilical hernia repair   Med: PNV, pepcid, omeprazole, trazodone, fluoxetine, B12, D3  Allergies: penicillin, oxycodone, codeine, gabapentin, prednisone (urticaria for all)    Vitals:   T(C): 36.8 (23 @ 12:19), Max: 36.8 (23 @ 12:19)  T(F): 98.2 (23 @ 12:19), Max: 98.2 (23 @ 12:19)  HR: 102 (23 @ 12:55) (102 - 112)  BP: 126/77 (23 @ 12:30) (126/77 - 126/77)  RR: 22 (23 @ 12:19) (22 - 22)  SpO2: 95% (23 @ 12:55) (95% - 97%)    General: AAOx3, NAD  Abd: Soft, nontender, gravid      FHT: Cat I     BRIDGET ERAZO is a 26yo  at 28 weeks (dated by LMP c/w x 1st trimester sono) with PMHx of GERD, gastroparesis, anxiety, iron deficiency anemia presenting for nausea and vomiting for 4 days. Patient reports feeling about 1 episode of vomiting every day for the past 4 days at night right before going to sleep. She has also recently had more problems with her acid reflux, has been taking her PRN pepcid every day as well as her normally scheduled omeprazole. Before the pregnancy, she was on 40mg pantoprazole but now is only on 10mg omeprazole and feels it is not enough to control her acid reflux. She has also been taking TUMS with no improvement. Is compliant with lifestyle modifications including not eating about 5 hours before bed and not eating spicy or acidic foods. Denies fever, chills, diarrhea, abdominal pain.     ROS:  Denies leakage of fluids, vaginal bleeding, painful contractions. Reports active fetal movement.   Denies RUQ pain, vision changes, increased leg swelling.   +Migraine headaches. No dizziness, lightheadedness.  +Knee, ankle, back pain at baseline.     PNC @    OBhx: denies  Gyn: denies hx of STIs, fibroids, or abnormal paps.  PMH: + GERD, gastroparesis, anxiety/depression, chronic pain  PSH: + umbilical hernia repair   Med: PNV, pepcid, omeprazole, trazodone, fluoxetine, B12, D3  Allergies: penicillin, oxycodone, codeine, gabapentin, prednisone (urticaria for all)    Vitals:   T(C): 36.8 (23 @ 12:19), Max: 36.8 (23 @ 12:19)  T(F): 98.2 (23 @ 12:19), Max: 98.2 (23 @ 12:19)  HR: 102 (23 @ 12:55) (102 - 112)  BP: 126/77 (23 @ 12:30) (126/77 - 126/77)  RR: 22 (23 @ 12:19) (22 - 22)  SpO2: 95% (23 @ 12:55) (95% - 97%)    General: AAOx3, NAD  Abd: Soft, nontender, gravid      FHT: Cat I     BRIDGET ERAZO is a 28yo  at 28w2d (dated by LMP c/w x 1st trimester sono) with PMHx of GERD, gastroparesis, anxiety, iron deficiency anemia presenting for nausea and vomiting for 4 days. Patient reports about 1 episode of vomiting every day for the past 4 days at night right before going to sleep. She has also recently had more problems with her acid reflux, has been taking her PRN pepcid every day as well as her normally scheduled omeprazole. Before the pregnancy, she was on 40mg pantoprazole but now is only on 10mg omeprazole and feels it is not enough to control her acid reflux. She has also been taking TUMS with no improvement. Is compliant with lifestyle modifications including not eating about 5 hours before bed and not eating spicy or acidic foods. Denies fever, chills, diarrhea, abdominal pain.     ROS:  Denies leakage of fluids, vaginal bleeding, painful contractions. Reports active fetal movement.   Denies RUQ pain, vision changes, increased leg swelling.   +Migraine headaches. No dizziness, lightheadedness.  +Knee, ankle, back pain at baseline.     PNC w Dr. Vazquez, uncomplicated    OBhx: denies  Gyn: denies hx of STIs, fibroids, or abnormal paps.  PMH: + GERD, gastroparesis, anxiety/depression, chronic pain  PSH: + umbilical hernia repair   Med: PNV, pepcid, omeprazole, trazodone, fluoxetine, B12, D3  Allergies: penicillin, oxycodone, codeine, gabapentin, prednisone (urticaria for all)

## 2023-02-28 NOTE — OB PROVIDER TRIAGE NOTE - NSHPPHYSICALEXAM_GEN_ALL_CORE
Vitals:   T(C): 36.8 (02-28-23 @ 12:19), Max: 36.8 (02-28-23 @ 12:19)  T(F): 98.2 (02-28-23 @ 12:19), Max: 98.2 (02-28-23 @ 12:19)  HR: 102 (02-28-23 @ 12:55) (102 - 112)  BP: 126/77 (02-28-23 @ 12:30) (126/77 - 126/77)  RR: 22 (02-28-23 @ 12:19) (22 - 22)  SpO2: 95% (02-28-23 @ 12:55) (95% - 97%)    General: AAOx3, NAD  Abd: Soft, nontender, gravid  Pelvic: deferred    FHT: reactive  [place]: not meng

## 2023-02-28 NOTE — OB RN TRIAGE NOTE - CHIEF COMPLAINT QUOTE
nausea and vomiting x 4 days, denies diarrhea or fever.  C/O getting out of breath more often, difficulty sleeping, headaches  I have sleep apnea

## 2023-02-28 NOTE — OB PROVIDER TRIAGE NOTE - NSOBPROVIDERNOTE_OBGYN_ALL_OB_FT
BRIDGET ERAZO is a 28yo  at 28w2d (dated by LMP c/w x 1st trimester sono) with PMHx of GERD, gastroparesis, anxiety, iron deficiency anemia presenting for nausea and vomiting for 4 days.    - VSS, tracing reactive  - For IVF, pepcid, and zofran  - CMP, CBC, UA pending  - Will c/w monitoring until able to tolerate PO    D/w Dr. Silverman    ADDENDUM:  - CMP, CBC, UA wnl  - Reports improvement in N/V with pepcid and zofran  - Tolerating PO  - For discharge home BRIDGET ERAZO is a 26yo  at 28w2d (dated by LMP c/w x 1st trimester sono) with PMHx of GERD, gastroparesis, anxiety, iron deficiency anemia presenting for nausea and vomiting for 4 days.    - VSS, tracing reactive  - For IVF, pepcid, and zofran  - CMP, CBC, UA pending  - C/w monitoring until able to tolerate PO    D/w Dr. Silverman    ADDENDUM:  - CMP, CBC, UA wnl. No laboratory signs of dehydration, infection, or electrolyte abnormalities.  - Reports improvement in N/V with pepcid and zofran. Now able to tolerate PO.  - For discharge home. Prescription for zofran 4mg q6 PRN and pantoprazole 40mg qd sent.  - Patient to c/w routine PNC. To call provider or return to triage if worsening of N/V despite medications, intolerance to PO, PTL symptoms, or for any other concerns.

## 2023-04-03 ENCOUNTER — OUTPATIENT (OUTPATIENT)
Dept: INPATIENT UNIT | Facility: HOSPITAL | Age: 28
LOS: 1 days | End: 2023-04-03
Payer: COMMERCIAL

## 2023-04-03 VITALS — OXYGEN SATURATION: 93 % | HEART RATE: 117 BPM

## 2023-04-03 VITALS — SYSTOLIC BLOOD PRESSURE: 132 MMHG | HEART RATE: 110 BPM | DIASTOLIC BLOOD PRESSURE: 76 MMHG

## 2023-04-03 DIAGNOSIS — O47.03 FALSE LABOR BEFORE 37 COMPLETED WEEKS OF GESTATION, THIRD TRIMESTER: ICD-10-CM

## 2023-04-03 PROCEDURE — 93010 ELECTROCARDIOGRAM REPORT: CPT

## 2023-04-03 PROCEDURE — 93005 ELECTROCARDIOGRAM TRACING: CPT

## 2023-04-03 NOTE — OB RN TRIAGE NOTE - FALL HARM RISK - UNIVERSAL INTERVENTIONS
Bed in lowest position, wheels locked, appropriate side rails in place/Call bell, personal items and telephone in reach/Instruct patient to call for assistance before getting out of bed or chair/Non-slip footwear when patient is out of bed/[place] to call system/Physically safe environment - no spills, clutter or unnecessary equipment/Purposeful Proactive Rounding/Room/bathroom lighting operational, light cord in reach

## 2023-04-03 NOTE — OB RN TRIAGE NOTE - NSICDXPASTMEDICALHX_GEN_ALL_CORE_FT
PAST MEDICAL HISTORY:  Chronic GERD     Gastroparesis     Giant cell tumor     Migraines     Obstructive sleep apnea     Umbilical hernia

## 2023-04-03 NOTE — OB PROVIDER TRIAGE NOTE - NSOBPROVIDERNOTE_OBGYN_ALL_OB_FT
A/P: BRIDGET ERAZO is a 27y GP at 33w1d who presents to L&D for evaluation of palpitations, tachycardia, and SOB.   - ECG was performed and showed sinus tachycardia with no signs of arrhythmia.   - Patient's symptoms have otherwise resolved.   - BPP 8/8, tracing reassuring and reactive. No activity on tocometry. No other OB complaints/issues.   - O2 sats WNL, BP WNL, HR mildly elevated but WNL for third trimester (<120)   - given counseling about adequate hydration and RTC precautions   - will f/u in office with OB
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
## 2023-04-04 ENCOUNTER — OUTPATIENT (OUTPATIENT)
Dept: OUTPATIENT SERVICES | Facility: HOSPITAL | Age: 28
LOS: 1 days | End: 2023-04-04
Payer: COMMERCIAL

## 2023-04-04 ENCOUNTER — RESULT REVIEW (OUTPATIENT)
Age: 28
End: 2023-04-04

## 2023-04-04 VITALS — OXYGEN SATURATION: 96 % | HEART RATE: 123 BPM

## 2023-04-04 VITALS — HEART RATE: 101 BPM | DIASTOLIC BLOOD PRESSURE: 70 MMHG | SYSTOLIC BLOOD PRESSURE: 118 MMHG

## 2023-04-04 DIAGNOSIS — O47.03 FALSE LABOR BEFORE 37 COMPLETED WEEKS OF GESTATION, THIRD TRIMESTER: ICD-10-CM

## 2023-04-04 PROCEDURE — 71275 CT ANGIOGRAPHY CHEST: CPT | Mod: 26,ME

## 2023-04-04 PROCEDURE — 59025 FETAL NON-STRESS TEST: CPT

## 2023-04-04 PROCEDURE — G1004: CPT

## 2023-04-04 PROCEDURE — G0463: CPT

## 2023-04-04 PROCEDURE — 99213 OFFICE O/P EST LOW 20 MIN: CPT | Mod: GC

## 2023-04-04 PROCEDURE — 71275 CT ANGIOGRAPHY CHEST: CPT | Mod: ME

## 2023-04-04 RX ORDER — DIPHENHYDRAMINE HCL 50 MG
50 CAPSULE ORAL ONCE
Refills: 0 | Status: COMPLETED | OUTPATIENT
Start: 2023-04-04 | End: 2023-04-04

## 2023-04-04 RX ORDER — HYDROCORTISONE 20 MG
200 TABLET ORAL ONCE
Refills: 0 | Status: COMPLETED | OUTPATIENT
Start: 2023-04-04 | End: 2023-04-04

## 2023-04-04 RX ORDER — FLUOXETINE HCL 10 MG
40 CAPSULE ORAL ONCE
Refills: 0 | Status: COMPLETED | OUTPATIENT
Start: 2023-04-04 | End: 2023-04-04

## 2023-04-04 RX ORDER — TRAZODONE HCL 50 MG
25 TABLET ORAL ONCE
Refills: 0 | Status: COMPLETED | OUTPATIENT
Start: 2023-04-04 | End: 2023-04-04

## 2023-04-04 RX ORDER — PYRIDOXINE HCL (VITAMIN B6) 100 MG
50 TABLET ORAL ONCE
Refills: 0 | Status: COMPLETED | OUTPATIENT
Start: 2023-04-04 | End: 2023-04-04

## 2023-04-04 RX ORDER — PREGABALIN 225 MG/1
1000 CAPSULE ORAL ONCE
Refills: 0 | Status: COMPLETED | OUTPATIENT
Start: 2023-04-04 | End: 2023-04-04

## 2023-04-04 RX ORDER — PANTOPRAZOLE SODIUM 20 MG/1
40 TABLET, DELAYED RELEASE ORAL ONCE
Refills: 0 | Status: COMPLETED | OUTPATIENT
Start: 2023-04-04 | End: 2023-04-04

## 2023-04-04 RX ADMIN — Medication 50 MILLIGRAM(S): at 19:56

## 2023-04-04 RX ADMIN — Medication 40 MILLIGRAM(S): at 19:57

## 2023-04-04 RX ADMIN — PREGABALIN 1000 MICROGRAM(S): 225 CAPSULE ORAL at 19:59

## 2023-04-04 RX ADMIN — PANTOPRAZOLE SODIUM 40 MILLIGRAM(S): 20 TABLET, DELAYED RELEASE ORAL at 19:59

## 2023-04-04 RX ADMIN — Medication 25 MILLIGRAM(S): at 19:59

## 2023-04-04 RX ADMIN — Medication 200 MILLIGRAM(S): at 15:52

## 2023-04-04 RX ADMIN — Medication 200 MILLIGRAM(S): at 19:53

## 2023-04-04 NOTE — OB PROVIDER TRIAGE NOTE - ATTENDING COMMENTS
28yo  at 33 2/7 weeks gestation presenting to triage for shortness of breath.  Low pulse ox noted    Patient underwent premedication for IV contrast and CT to rule out P.E.     CTA chest with no evidence of pulmonary embolism.       - Patient reports resolution of SOB. Denies any additional symptoms at this time. Etiology of tachycardia and SOB is likely physiologic. Patient stable for discharge home. To follow up with OBGYN within the week. To call provider if SOB returns and worsens, if she experiences chest pain, or for any other concerns. Labor return precautions given.    Dr Zapata Bill As A Line Item Or As Units: Line Item

## 2023-04-04 NOTE — OB RN TRIAGE NOTE - CHIEF COMPLAINT QUOTE
I feel shortness of breath and my heart racing. I went to my primary this morning to my primary and they told me to drink more water.
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
## 2023-04-04 NOTE — OB PROVIDER TRIAGE NOTE - NSOBPROVIDERNOTE_OBGYN_ALL_OB_FT
28yo  at 33 2/7 weeks gestation presenting to L&D for work-up of shortness of breath    - Patient counseled that her symptoms are most likely physiologic dyspnea on exertion related to thrid trimester pregnancy, however since her symptoms are persistent and worse than yesterday, we recommend CTA to evaluate for PE  - Patient reports understanding of recommendation  - Risks of CT scan in pregnancy reviewed including: fetal malformations, developmental delay, fetal growth restriction, carcinogenesis, and genetic mutations. Patient reassured that at the dose of radiation for CTA chest, none of these effects have been reported, although the exact threshold dose is not know and is unable to be studied. Patient reassured that the risks of these effects are even lower as she is in the third trimester.  - Patient reports understanding of risks and agrees to proceed with CT scan. Radiology consent forms signed  - As patient has IV contrast allergy, we will premedicate her with IV steroids 5 hours and 1 hour prior to CTA and with IV benadryl 1 hour prior to CTA  - Patient reports understanding of time frame necessary to safely pre-medicate her  - Will coordinate with radiology for timing of scan    Patient seen, evaluated and counseled with Dr. Silverman 26yo  at 33 2/7 weeks gestation presenting to L&D for work-up of shortness of breath    - Patient counseled that her symptoms are most likely physiologic dyspnea on exertion related to thrid trimester pregnancy, however since her symptoms are persistent and worse than yesterday, we recommend CTA to evaluate for PE  - Patient reports understanding of recommendation  - Risks of CT scan in pregnancy reviewed including: fetal malformations, developmental delay, fetal growth restriction, carcinogenesis, and genetic mutations. Patient reassured that at the dose of radiation for CTA chest, none of these effects have been reported, although the exact threshold dose is not know and is unable to be studied. Patient reassured that the risks of these effects are even lower as she is in the third trimester.  - Patient reports understanding of risks and agrees to proceed with CT scan. Radiology consent forms signed  - As patient has IV contrast allergy, we will premedicate her with IV steroids 5 hours and 1 hour prior to CTA and with IV benadryl 1 hour prior to CTA  - Patient reports understanding of time frame necessary to safely pre-medicate her  - Will coordinate with radiology for timing of scan    Patient seen, evaluated and counseled with Dr. Silverman    ADDENDUM  @ 2300 - Yoli Fabian, PGY1    - Last pulse ox 92, with repeat showing 97. Pulse ox otherwise wnl since presentation to triage.  - Patient persistently mildly tachycardic to 100s-110s since yesterday's triage visit. Patient is afebrile and EKG 4/3 showed NSR.   - Afebrile, normotensive.  - CTA chest with no evidence of pulmonary embolism.   - Patient reports resolution of SOB. Denies any additional symptoms at this time. Etiology of tachycardia and SOB is likely physiologic. Patient stable for discharge home. To follow up with OBGYN within the week. To call provider if SOB returns and worsens, if she experiences chest pain, or for any other concerns. Labor return precautions given.    D/w Dr. Zapata

## 2023-04-04 NOTE — OB PROVIDER TRIAGE NOTE - NSHPPHYSICALEXAM_GEN_ALL_CORE
Vital Signs Last 24 Hrs  T(C): 36.3 (04 Apr 2023 13:41), Max: 36.3 (04 Apr 2023 13:41)  T(F): 97.3 (04 Apr 2023 13:41), Max: 97.3 (04 Apr 2023 13:41)  HR: 111 (04 Apr 2023 14:28) (101 - 126)  BP: 119/69 (04 Apr 2023 13:44) (119/69 - 132/76)  RR: 18 (04 Apr 2023 13:41) (18 - 18)  SpO2: 97% (04 Apr 2023 14:28) (90% - 99%)    Parameters below as of 04 Apr 2023 13:41  Patient On (Oxygen Delivery Method): room air    Gen: NAD, AOx4  CV: tachycardic, regular rhythm  Pulm: slightly increased work of breathing, especially at the end of long sentences  Abd: soft, gravid, nontender  Ext: mild symmetric b/l LE edema, no erythema or tenderness

## 2023-04-04 NOTE — OB RN TRIAGE NOTE - FALL HARM RISK - UNIVERSAL INTERVENTIONS
Bed in lowest position, wheels locked, appropriate side rails in place/Call bell, personal items and telephone in reach/Instruct patient to call for assistance before getting out of bed or chair/Non-slip footwear when patient is out of bed/[place] to call system/Physically safe environment - no spills, clutter or unnecessary equipment/Purposeful Proactive Rounding/Room/bathroom lighting operational, light cord in reach

## 2023-04-04 NOTE — OB PROVIDER TRIAGE NOTE - NSHOSPITALIZATION_OBGYN_ALL_OB
“Patient's name, , procedure and correct site were confirmed during the [place] Timeout.”
No

## 2023-04-21 ENCOUNTER — OUTPATIENT (OUTPATIENT)
Dept: OUTPATIENT SERVICES | Facility: HOSPITAL | Age: 28
LOS: 1 days | End: 2023-04-21
Payer: COMMERCIAL

## 2023-04-21 VITALS
DIASTOLIC BLOOD PRESSURE: 87 MMHG | SYSTOLIC BLOOD PRESSURE: 133 MMHG | TEMPERATURE: 97 F | HEART RATE: 122 BPM | RESPIRATION RATE: 19 BRPM

## 2023-04-21 VITALS — SYSTOLIC BLOOD PRESSURE: 123 MMHG | DIASTOLIC BLOOD PRESSURE: 70 MMHG | HEART RATE: 100 BPM

## 2023-04-21 DIAGNOSIS — O47.03 FALSE LABOR BEFORE 37 COMPLETED WEEKS OF GESTATION, THIRD TRIMESTER: ICD-10-CM

## 2023-04-21 PROBLEM — G47.33 OBSTRUCTIVE SLEEP APNEA (ADULT) (PEDIATRIC): Chronic | Status: ACTIVE | Noted: 2023-04-03

## 2023-04-21 PROBLEM — G43.909 MIGRAINE, UNSPECIFIED, NOT INTRACTABLE, WITHOUT STATUS MIGRAINOSUS: Chronic | Status: ACTIVE | Noted: 2023-04-03

## 2023-04-21 PROBLEM — K42.9 UMBILICAL HERNIA WITHOUT OBSTRUCTION OR GANGRENE: Chronic | Status: ACTIVE | Noted: 2023-04-03

## 2023-04-21 PROBLEM — K31.84 GASTROPARESIS: Chronic | Status: ACTIVE | Noted: 2023-04-03

## 2023-04-21 PROBLEM — D48.9 NEOPLASM OF UNCERTAIN BEHAVIOR, UNSPECIFIED: Chronic | Status: ACTIVE | Noted: 2023-04-03

## 2023-04-21 PROCEDURE — 59025 FETAL NON-STRESS TEST: CPT

## 2023-04-21 PROCEDURE — G0463: CPT

## 2023-04-21 PROCEDURE — 87653 STREP B DNA AMP PROBE: CPT

## 2023-04-21 PROCEDURE — 99214 OFFICE O/P EST MOD 30 MIN: CPT | Mod: GC

## 2023-04-21 PROCEDURE — 87591 N.GONORRHOEAE DNA AMP PROB: CPT

## 2023-04-21 PROCEDURE — 87491 CHLMYD TRACH DNA AMP PROBE: CPT

## 2023-04-21 PROCEDURE — 36415 COLL VENOUS BLD VENIPUNCTURE: CPT

## 2023-04-21 PROCEDURE — 87800 DETECT AGNT MULT DNA DIREC: CPT

## 2023-04-21 RX ORDER — METOCLOPRAMIDE HCL 10 MG
10 TABLET ORAL ONCE
Refills: 0 | Status: DISCONTINUED | OUTPATIENT
Start: 2023-04-21 | End: 2023-04-21

## 2023-04-21 RX ORDER — METOCLOPRAMIDE HCL 10 MG
5 TABLET ORAL ONCE
Refills: 0 | Status: COMPLETED | OUTPATIENT
Start: 2023-04-21 | End: 2023-04-21

## 2023-04-21 RX ORDER — ACETAMINOPHEN 500 MG
975 TABLET ORAL ONCE
Refills: 0 | Status: COMPLETED | OUTPATIENT
Start: 2023-04-21 | End: 2023-04-21

## 2023-04-21 RX ADMIN — Medication 5 MILLIGRAM(S): at 15:28

## 2023-04-21 RX ADMIN — Medication 975 MILLIGRAM(S): at 15:13

## 2023-04-21 NOTE — OB RN TRIAGE NOTE - CHIEF COMPLAINT QUOTE
migraine for the last few days with migraine for the last few days with lightheadedness, dizziness, nausea and also contractions and back pain

## 2023-04-21 NOTE — OB PROVIDER TRIAGE NOTE - ATTENDING COMMENTS
28yo  F 35w5d GA wit history of migraines, GERD, and gastroparesis presenting with headache, n/v, and possible contractions, responsive to PO hydration, Tylenol and Reglan. No s/s  labor, return precautions discussed.
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
## 2023-04-21 NOTE — OB RN TRIAGE NOTE - FALL HARM RISK - HARM RISK INTERVENTIONS

## 2023-04-21 NOTE — OB PROVIDER TRIAGE NOTE - NSHPPHYSICALEXAM_GEN_ALL_CORE
ICU Vital Signs Last 24 Hrs  T(C): 36.1 (21 Apr 2023 13:45), Max: 36.1 (21 Apr 2023 13:45)  T(F): 97 (21 Apr 2023 13:45), Max: 97 (21 Apr 2023 13:45)  HR: 122 (21 Apr 2023 14:54) (108 - 124)  BP: 123/85 (21 Apr 2023 14:14) (123/85 - 133/87)  RR: 19 (21 Apr 2023 13:45) (19 - 19)  SpO2: 96% (21 Apr 2023 14:54) (96% - 100%)    O2 Parameters below as of 21 Apr 2023 13:45  Patient On (Oxygen Delivery Method): room air    General: No acute distress  Head: Normocephalic, atraumatic  Abdomen: Soft, nontender. Gravid. No peritoneal signs.   : No discharge. Cervical os closed. Significant vaginal tenderness on manual pelvic examination.     FHR: 120bpm, normal variability, +accels, no decels  [place]: Regular contractions ICU Vital Signs Last 24 Hrs  T(C): 36.1 (21 Apr 2023 13:45), Max: 36.1 (21 Apr 2023 13:45)  T(F): 97 (21 Apr 2023 13:45), Max: 97 (21 Apr 2023 13:45)  HR: 122 (21 Apr 2023 14:54) (108 - 124)  BP: 123/85 (21 Apr 2023 14:14) (123/85 - 133/87)  RR: 19 (21 Apr 2023 13:45) (19 - 19)  SpO2: 96% (21 Apr 2023 14:54) (96% - 100%)    O2 Parameters below as of 21 Apr 2023 13:45  Patient On (Oxygen Delivery Method): room air    General: No acute distress  Head: Normocephalic, atraumatic  Abdomen: Soft, nontender. Gravid. No peritoneal signs.   : No discharge. Cervical os closed. Significant vaginal tenderness on manual pelvic examination.     FHR: 120bpm, normal variability, +accels, no decels  [place]: irregular contractions

## 2023-04-21 NOTE — OB RN TRIAGE NOTE - NSICDXPASTMEDICALHX_GEN_ALL_CORE_FT
PAST MEDICAL HISTORY:  Chronic GERD     Gastroparesis     Giant cell tumor     Migraines     Obstructive sleep apnea     Umbilical hernia      PAST MEDICAL HISTORY:  Anxiety and depression     Chronic GERD     Gastroparesis     Giant cell tumor     H/O left wrist surgery     H/O right knee surgery     History of arthroscopy of right shoulder     Migraines     Obstructive sleep apnea     Umbilical hernia

## 2023-04-21 NOTE — OB PROVIDER TRIAGE NOTE - NSOBPROVIDERNOTE_OBGYN_ALL_OB_FT
28yo  F 35w5d GA wit history of migraines, GERD, and gastroparesis presenting with headache with visual changes, n/v, and possible contractions. FHR tracing unremarkable. Patient is clinically stable; however, should continue to observe for improvement of symptoms following medical interventions.     Plan  - Acetaminophen 975mg for headache & back pain  - Reglan 5mg PO for nausea  - F/u BV panel, GBS, and GC/Chlamydia   - Monitor for improvement of headache and nausea 26yo  F 35w5d GA wit history of migraines, GERD, and gastroparesis presenting with headache, n/v, and possible contractions.    -VSS  -NST reactive  - HA consistent with migraine; feels similar to migraines in past  -Tylenol and reglan given  - F/u BV panel, GBS, and GC/Chlamydia   -PTL precautions reviewed  -Cleared to go home by her obgyn with follow up in the office    D/w Dr. Fabian

## 2023-04-22 LAB
C TRACH RRNA SPEC QL NAA+PROBE: SIGNIFICANT CHANGE UP
CANDIDA AB TITR SER: SIGNIFICANT CHANGE UP
G VAGINALIS DNA SPEC QL NAA+PROBE: SIGNIFICANT CHANGE UP
N GONORRHOEA RRNA SPEC QL NAA+PROBE: SIGNIFICANT CHANGE UP
T VAGINALIS SPEC QL WET PREP: SIGNIFICANT CHANGE UP

## 2023-04-23 LAB
GROUP B BETA STREP DNA (PCR): SIGNIFICANT CHANGE UP
SOURCE GROUP B STREP: SIGNIFICANT CHANGE UP

## 2023-05-01 ENCOUNTER — OUTPATIENT (OUTPATIENT)
Dept: INPATIENT UNIT | Facility: HOSPITAL | Age: 28
LOS: 1 days | End: 2023-05-01
Payer: COMMERCIAL

## 2023-05-01 VITALS — DIASTOLIC BLOOD PRESSURE: 73 MMHG | HEART RATE: 84 BPM | SYSTOLIC BLOOD PRESSURE: 128 MMHG

## 2023-05-01 VITALS
TEMPERATURE: 97 F | RESPIRATION RATE: 16 BRPM | SYSTOLIC BLOOD PRESSURE: 131 MMHG | HEART RATE: 108 BPM | DIASTOLIC BLOOD PRESSURE: 85 MMHG

## 2023-05-01 DIAGNOSIS — O47.1 FALSE LABOR AT OR AFTER 37 COMPLETED WEEKS OF GESTATION: ICD-10-CM

## 2023-05-01 PROBLEM — Z98.890 OTHER SPECIFIED POSTPROCEDURAL STATES: Chronic | Status: ACTIVE | Noted: 2023-04-21

## 2023-05-01 PROBLEM — F41.9 ANXIETY DISORDER, UNSPECIFIED: Chronic | Status: ACTIVE | Noted: 2023-04-21

## 2023-05-01 PROCEDURE — G0463: CPT

## 2023-05-01 PROCEDURE — 59025 FETAL NON-STRESS TEST: CPT

## 2023-05-01 NOTE — OB PROVIDER TRIAGE NOTE - ATTENDING COMMENTS
27y  at 37w1d GA assessed for r/o labor  - cervical exam at office this AM closed, closed on exam later today in triage   - contractions every 10+ mins, irregular   - FHT reactive   - no other OB complaints at this time   - not in labor, cleared for discharge   - given RTC precautions

## 2023-05-01 NOTE — OB RN TRIAGE NOTE - FALL HARM RISK - UNIVERSAL INTERVENTIONS
Bed in lowest position, wheels locked, appropriate side rails in place/Call bell, personal items and telephone in reach/Instruct patient to call for assistance before getting out of bed or chair/Non-slip footwear when patient is out of bed/[place] to call system/Physically safe environment - no spills, clutter or unnecessary equipment/Purposeful Proactive Rounding/Room/bathroom lighting operational, light cord in reach

## 2023-05-01 NOTE — OB RN TRIAGE NOTE - FALL HARM RISK - FACTORS NURSING JUDGEMENT
Patient calling out \"help.\"  RN found patient looking for o2 which had fallen on the floor, and gasping for air.  Replaced on 5L nasal cannula and vitals taken.  Dr. Armstrong paged in regards to patient having increased SOB, audible rales loud enough to hear from hallway, and increased crackles upon auscultation.  Patient endorsing orthopnea.  Per MD to have respiratory come and assess for possibly another breathing treatment.  Will continue to monitor patient.     No
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
## 2023-05-01 NOTE — OB PROVIDER TRIAGE NOTE - NSOBPROVIDERNOTE_OBGYN_ALL_OB_FT
A/P: BRIDGET ERAZO is a 27y  at 37w1d GA assessed for r/o labor  - Exam unchanged from office visit  - Patient will call clinic for another appointment in the next week     [place]: Irregular contractions   Dispo: Continue to observe.     Discussed with Dr. Ann A/P: BRIDGET ERAZO is a 27y  at 37w1d GA assessed for r/o labor  - Exam unchanged from office visit  - Patient will call clinic for another appointment in the next week   Fetus: reactive   [place]: Irregular contractions   Dispo: Home with precautions. Patient was advised to return to the hospital for decreased fetal movement, vaginal bleeding, leakage of fluid, and/or contractions with increased intensity or frequency.     Discussed with Dr. Ann
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
## 2023-05-01 NOTE — OB PROVIDER TRIAGE NOTE - NSHPPHYSICALEXAM_GEN_ALL_CORE
T(C): 36 (05-01-23 @ 15:15), Max: 36 (05-01-23 @ 15:15)  HR: 89 (05-01-23 @ 15:39) (89 - 114)  BP: 131/85 (05-01-23 @ 15:15) (131/85 - 131/85)  RR: 16 (05-01-23 @ 15:15) (16 - 16)  SpO2: 98% (05-01-23 @ 15:39) (96% - 98%)    Gen: NAD, well-appearing, resting comfortably on room air   Abd: soft, gravid  Ext: non-edematous, non-tender   SVE: 0/70/-3  FHT: baseline 115, moderate variability, +accels, -decels   [place]: Irregular contractions

## 2023-05-08 ENCOUNTER — OUTPATIENT (OUTPATIENT)
Dept: INPATIENT UNIT | Facility: HOSPITAL | Age: 28
LOS: 1 days | End: 2023-05-08
Payer: COMMERCIAL

## 2023-05-08 VITALS
RESPIRATION RATE: 17 BRPM | HEART RATE: 100 BPM | DIASTOLIC BLOOD PRESSURE: 72 MMHG | SYSTOLIC BLOOD PRESSURE: 121 MMHG | TEMPERATURE: 97 F

## 2023-05-08 DIAGNOSIS — O47.1 FALSE LABOR AT OR AFTER 37 COMPLETED WEEKS OF GESTATION: ICD-10-CM

## 2023-05-08 PROCEDURE — 59025 FETAL NON-STRESS TEST: CPT

## 2023-05-08 PROCEDURE — G0463: CPT

## 2023-05-08 PROCEDURE — 99213 OFFICE O/P EST LOW 20 MIN: CPT | Mod: GC

## 2023-05-08 RX ORDER — ACETAMINOPHEN 500 MG
975 TABLET ORAL ONCE
Refills: 0 | Status: COMPLETED | OUTPATIENT
Start: 2023-05-08 | End: 2023-05-08

## 2023-05-08 RX ADMIN — Medication 975 MILLIGRAM(S): at 23:00

## 2023-05-08 NOTE — OB RN TRIAGE NOTE - CHIEF COMPLAINT QUOTE
"I am meng frequently I think I'm in labor" "I have back pain and am meng frequently I think I'm in labor"

## 2023-05-08 NOTE — OB PROVIDER TRIAGE NOTE - NSOBPROVIDERNOTE_OBGYN_ALL_OB_FT
BRIDGET ERAZO is a 27y  at 38w1d GA who presents to L&D for contractions and mild back pain starting this evening here for evaluation of labor    -VSS  -Cervix closed; uterine irritability  -Not in labor at this time  -Scheduled for elective pCS on Wednesday at [facility]   -Continue on NST monitoring; need 2 accelerations, 1 so far  -Labor return precautions given    D/w Dr. Arguello BRIDGET ERAZO is a 27y  at 38w1d GA who presents to L&D for contractions and mild back pain starting this evening here for evaluation of labor    -VSS  -Cervix closed; uterine irritability  -Not in labor at this time  -Scheduled for elective pCS on Wednesday at [facility]   -Continue on NST monitoring; need 2 accelerations, 1 so far  -Labor return precautions given    D/w Dr. Arguello    Update:   NST reactive  Home with follow up with Dr. Ramírez

## 2023-05-08 NOTE — OB PROVIDER TRIAGE NOTE - NSHPPHYSICALEXAM_GEN_ALL_CORE
Vital Signs Last 24 Hrs  T(C): 36.1 (08 May 2023 22:13), Max: 36.1 (08 May 2023 21:57)  T(F): 96.98 (08 May 2023 22:13), Max: 97 (08 May 2023 21:57)  HR: 100 (08 May 2023 22:12) (100 - 100)  BP: 121/72 (08 May 2023 22:12) (121/72 - 121/72)  BP(mean): --  RR: 17 (08 May 2023 21:57) (17 - 17)    General: AAOx3, well appearing  Abdomen: soft, gravid, nontender, nonperitonitic  SVE: 0/50/-3  FHT: 130bpm baseline, moderate variability, + accel, no decels  [place]: irritable

## 2023-05-08 NOTE — OB PROVIDER TRIAGE NOTE - ATTENDING COMMENTS
27y  at 38w1d GA who presents to L&D for contractions and mild back pain starting this evening. She was examined at the office this morning and found to be closed. Pt denies vaginal bleeding,  and leakage of fluid. She reposts fetal movement.  HR: 100 (08 May 2023 22:12) (100 - 100)  BP: 121/72 (08 May 2023 22:12) (121/72 - 121/72)  FHT - reactive  [facility] [place] - irregular contractions  SVE 0/50  26 y/o  @ 38+1 with contraction - not in labor   - Maternal and fetal status reassuring   - Scheduled c/s wed at [facility] with Dr Snow  - plan d/c home and f/u as scheduled    Nelida Lomas MD
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
## 2023-05-08 NOTE — OB RN TRIAGE NOTE - NSICDXPASTMEDICALHX_GEN_ALL_CORE_FT
Javier Russo  CARDIOVASCULAR DISEASE  [address] [id number]  Phone: [phone]  Fax: [phone]  Established Patient  Follow Up Time: 1 week Javier Russo  CARDIOVASCULAR DISEASE  [address] [facility] [address]  [place], [place] [id number]  Phone: [phone]  Fax: [phone]  Established Patient  Follow Up Time: 1 week    Jaya Lauren  INTERNAL MEDICINE  [address] [place] [address]  Phone: [phone]  Fax: [phone]  Follow Up Time: PAST MEDICAL HISTORY:  Anxiety and depression     Chronic GERD     Gastroparesis     Giant cell tumor     H/O left wrist surgery     H/O right knee surgery     History of arthroscopy of right shoulder     Migraines     Obstructive sleep apnea     Umbilical hernia

## 2023-05-08 NOTE — OB RN TRIAGE NOTE - FALL HARM RISK - HARM RISK INTERVENTIONS

## 2023-05-09 VITALS — DIASTOLIC BLOOD PRESSURE: 75 MMHG | SYSTOLIC BLOOD PRESSURE: 120 MMHG | HEART RATE: 93 BPM

## 2024-03-03 ENCOUNTER — NON-APPOINTMENT (OUTPATIENT)
Age: 29
End: 2024-03-03
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
## 2024-07-17 NOTE — OB RN TRIAGE NOTE - NSHISCREENINGQ1_ED_A_ED
Contacted Pt for endoscopy pre-call for upcoming procedure.  Pre-call complete, Pt does not have any further questions. Arrival time: 11:00AM    Instructions to email:       Colonoscopy Procedure Prep Instructions        Date of procedure: 7/22/24 Arrive at: 11:00 am     Location of Department:   [facility] [address] [place] [address]   Take the Elevators to 2nd Floor Endoscopy Procedural Area     As soon as possible:   your prep from pharmacy and over the counter DULCOLAX LAXATIVE TABLETS      On the day before your procedure   What You CAN do:   You may have clear liquids ONLY -see below for list.      Liquids That Are OK to Drink:   Water  Sports drinks (Gatorade, Power-Aid)  Coffee or tea (no cream or nondairy creamer)  Clear juices without pulp (apple, white grape)  Gelatin desserts (no fruit or toppings)  Clear soda (sprite, coke, ginger ale)  Chicken broth (until 12 midnight the night before procedure)        What You CANNOT do:   Do not EAT solid food, drink milk or anything   colored red.  Do not drink alcohol.  Do not take oral medications within 1 hour of starting   each dose of SUPREP.  No gum chewing or candy morning of procedure.        Note:   (Please disregard the insert instructions from pharmacy).  SUPREP Bowel Prep Kit is indicated for cleansing of the colon as a preparation for colonoscopy in adults.   Be sure to tell your doctor about all the medicines you take, including prescription and non-prescription medicines, vitamins, and herbal supplements. SUPREP Bowel Prep Kit may affect how other medicines work.  Medication taken by mouth may not be absorbed properly when taken within 1 hour before the start of each dose of SUPREP Bowel Prep Kit.     It is not uncommon to experience some abdominal cramping, nausea and/or vomiting when taking the prep. If you have nausea and/or vomiting while taking the prep, stop drinking for 20 to 30 minutes then continue.     How to  take prep:     SUPREP Bowel Prep Kit is a (2-day) prep.   Both 6-ounce bottles are required for a complete preparation for colonoscopy. Dilute the solution concentrate as directed prior to use. You must drink water with each dose of SUPREP, and additional water after each dose.     DOSE 1--Day Before Colonoscopy 7/21/24     Drink at least 6 to 8 glasses of clear liquids from time you wake up until you begin your prep and then continue until bedtime to avoid dehydration.      12:00 pm (NOON) Take four (4) Dulcolax (Bisacodyl) tablets with at least 8 ounces or more of clear liquids.       6:00 pm:     You must complete Steps 1 through 4 using one (1) 6-ounce bottle before going to bed as shown below:     Step 1-Pour ONE (1) 6-ounce bottle of SUPREP liquid into the mixing container.  Step 2-Add cool drinking water to the 16-ounce line on the container and mix.  Step 3-Drink ALL the liquid in the container.  Step 4-You must drink two (2) more 16-ounce containers of water over the next 1 hour.  IMPORTANT: If you experience preparation-related symptoms (for example, nausea, bloating, or cramping), stop, or slow the rate of drinking the additional water until your symptoms decrease.     DOSE 2--Day of the Colonoscopy 7/22/24 at 2-3 AM.     For this dose, repeat Steps 1 through 4 shown above using the other 6-ounce bottle.   You may continue drinking water/clear liquids until   4 hours before your colonoscopy or as directed by the scheduling nurse  8:00 am.     For information about your procedure, two (2) things to view prior to colonoscopy:  Please watch this informational video. It is important to watch this animated consent video prior to your arrival. If you haven't watched the video prior to arriving, you are required to watch it during admission which can causes delays.     Options for viewing:   Using a keyboard:  press and hold the control tab (Ctrl) and left mouse click to follow links.            Colonoscopy  Instructional Video                                                                                   OR     Type link address into your web browser's address bar:  [email or web address]        Educational Booklet with pictures:        Colonoscopy Prep - Liquid        Comments:           IMPORTANT INFORMATION TO KNOW BEFORE YOUR PROCEDURE     [facility] 2nd Floor     If your procedure requires the administration of anesthesia, it is necessary for a responsible adult to drive you home. (Medical Transportation, Uber, Lyft, Taxi, etc. may ONLY be used if a responsible adult is present to accompany you home.  The responsible adult CAN'T be the  of the service).       person must be available to return to pick you up within 15 minutes of being notified of discharge.         Please bring a picture ID, insurance card, & copayment        Take Medications as directed below:     If you are taking any injectable medication (s) for weight loss and/or diabetes  weekly, hold for 8 days prior to your scheduled procedure, please stop taking Ozempic (Semaglutide) on 7/14/24. After the procedure, your provider will inform you  of when to resume injection.                   If you begin taking any blood thinning medications or injectable weight loss/diabetes medications (other than insulin) , please contact the endoscopy scheduling department listed below as soon as possible.     If you are diabetic see the attached instruction sheet regarding your medication.      If you take HEART, BLOOD PRESSURE, SEIZURE, PAIN, LUNG (including inhalers/nebulizers), ANTI-REJECTION (transplant patients), or PSYCHIATRIC medications, please take at your regular times with a sip of water or as directed by the scheduling nurse.      Important contact information:     Endoscopy [phone] Hours of operation Monday-Friday 8:00-4:30pm.     Questions about insurance or financial obligations  call [phone] or [phone].     If you have questions regarding the prep or need to reschedule, please call [phone]. After hours questions requiring immediate assistance, contact [facility] nurse line at [phone] or [phone].   NOTE:      On occasion, unforeseen circumstances may cause a delay in your procedure start time. We respect your time and appreciate your patience during these circumstances.      No
